# Patient Record
Sex: MALE | Race: WHITE | NOT HISPANIC OR LATINO | Employment: FULL TIME | URBAN - NONMETROPOLITAN AREA
[De-identification: names, ages, dates, MRNs, and addresses within clinical notes are randomized per-mention and may not be internally consistent; named-entity substitution may affect disease eponyms.]

---

## 2017-06-26 ENCOUNTER — TELEMEDICINE ORIGINATING SITE VISIT (OUTPATIENT)
Dept: MEDICAL GROUP | Facility: CLINIC | Age: 62
End: 2017-06-26
Payer: OTHER GOVERNMENT

## 2017-06-26 ENCOUNTER — TELEMEDICINE2 (OUTPATIENT)
Dept: MEDICAL GROUP | Age: 62
End: 2017-06-26
Payer: OTHER GOVERNMENT

## 2017-06-26 VITALS
RESPIRATION RATE: 16 BRPM | DIASTOLIC BLOOD PRESSURE: 77 MMHG | HEIGHT: 72 IN | OXYGEN SATURATION: 96 % | SYSTOLIC BLOOD PRESSURE: 133 MMHG | WEIGHT: 167 LBS | BODY MASS INDEX: 22.62 KG/M2 | HEART RATE: 80 BPM | TEMPERATURE: 99 F

## 2017-06-26 DIAGNOSIS — I10 ESSENTIAL HYPERTENSION: ICD-10-CM

## 2017-06-26 DIAGNOSIS — E55.9 VITAMIN D DEFICIENCY: ICD-10-CM

## 2017-06-26 DIAGNOSIS — E53.8 VITAMIN B 12 DEFICIENCY: ICD-10-CM

## 2017-06-26 DIAGNOSIS — K63.5 POLYP OF COLON, UNSPECIFIED PART OF COLON, UNSPECIFIED TYPE: ICD-10-CM

## 2017-06-26 DIAGNOSIS — Z12.5 SCREENING FOR PROSTATE CANCER: ICD-10-CM

## 2017-06-26 DIAGNOSIS — Z13.220 SCREENING CHOLESTEROL LEVEL: ICD-10-CM

## 2017-06-26 DIAGNOSIS — G62.9 NEUROPATHY: ICD-10-CM

## 2017-06-26 PROCEDURE — 99214 OFFICE O/P EST MOD 30 MIN: CPT | Mod: GT | Performed by: INTERNAL MEDICINE

## 2017-06-26 ASSESSMENT — PATIENT HEALTH QUESTIONNAIRE - PHQ9: CLINICAL INTERPRETATION OF PHQ2 SCORE: 0

## 2017-06-26 NOTE — PROGRESS NOTES
CC: Follow-up hypertension    Verified identification with patient. Secured video conference with RN presenter in [facility]      HPI:   Eugene presents today with the following.    1. Essential hypertension  Patient has been compliant with his lisinopril 20 mg daily. Blood pressure averaging 1:30/75. Patient denies chest pain, headache, dizziness, shortness of breath or edema.    2. Polyp of colon, unspecified part of colon, unspecified type  Per patient, colonoscopy performed in [place] in 2014. Possible precancerous lesions. Patient has been in contact with the referral department for GI consult. Patient denies melena, stool changes, weight loss.    3. Vitamin B 12 deficiency  Patient taking over-the-counter liquid vitamin B-12. No labs available    4. Neuropathy (CMS-HCC)  Secondary to vitamin B 12 deficiency        Patient Active Problem List    Diagnosis Date Noted   • Hypertension 06/20/2016   • Environmental allergies 06/20/2016   • Colon polyp 06/20/2016   • Neuropathy (CMS-HCC) 06/20/2016   • Vitamin B 12 deficiency 06/20/2016       Current Outpatient Prescriptions   Medication Sig Dispense Refill   • lisinopril (PRINIVIL) 20 MG Tab Take 1 Tab by mouth every day. 90 Tab 3   • lisinopril (PRINIVIL) 20 MG Tab Take 20 mg by mouth every day.     • fluticasone (FLONASE) 50 MCG/ACT nasal spray Spray 1 Spray in nose every day.       No current facility-administered medications for this visit.         Allergies as of 06/26/2017   • (No Known Allergies)        ROS: As per HPI. Patient denies all other cardiopulmonary, GI, neurologic, musculoskeletal symptoms    /77 mmHg  Pulse 80  Temp(Src) 37.2 °C (99 °F)  Resp 16  Ht 1.829 m (6')  Wt 75.751 kg (167 lb)  BMI 22.64 kg/m2  SpO2 96%    Physical Exam:  Gen:         Alert and oriented, No apparent distress.  Neck:        No Lymphadenopathy or Bruits.  Lungs:     Clear to auscultation bilaterally  CV:          Regular rate and rhythm. No murmurs, rubs or  gallops.  Abd:         Soft non tender, non distended. Normal active bowel sounds.  No  Hepatosplenomegaly, No pulsatile masses.                   Ext:          No clubbing, cyanosis, edema.      Assessment and Plan.   62 y.o. male with the following issues.    1. Essential hypertension  Continue lisinopril 20 mg daily  -DASH diet    - COMP METABOLIC PANEL; Future  - CBC WITH DIFFERENTIAL; Future    2. Polyp of colon, unspecified part of colon, unspecified type  Follow-up colonoscopy with GI  Obtain medical records from prior colonoscopy.    - CBC WITH DIFFERENTIAL; Future    3. Vitamin B 12 deficiency  Check vitamin B-12 levels    - CBC WITH DIFFERENTIAL; Future    4. Neuropathy (CMS-HCC)    - VITAMIN B12; Future    5. Screening cholesterol level  No recent lipid panel    - LIPID PROFILE; Future    6. Vitamin D deficiency    - VITAMIN D,25 HYDROXY; Future    7. Screening for prostate cancer    - PROSTATE SPECIFIC AG DIAGNOSTIC; Future            Please note that this dictation was created using voice recognition software. I have made every reasonable attempt to correct obvious errors, but expect that there are errors of grammar and possible content that I did not discover before finalizing note.

## 2017-06-26 NOTE — MR AVS SNAPSHOT
Eugene Klein   2017 12:00 PM   Telemedicine2   MRN: [id number]    Department:  [address] [place] [address]   Dept Phone:  [phone]    Description:  Male : 1955   Provider:  Magdalena Salazar M.D.; TELEMED [place]           Reason for Visit     Medication Refill           Allergies as of 2017     No Known Allergies      You were diagnosed with     Essential hypertension   [0163458]       Polyp of colon, unspecified part of colon, unspecified type   [3923514]       Vitamin B 12 deficiency   [693354]       Neuropathy (CMS-HCC)   [854336]       Screening cholesterol level   [015324]       Vitamin D deficiency   [2998638]         Vital Signs     Blood Pressure Pulse Temperature Respirations Height Weight    133/77 mmHg 80 37.2 °C (99 °F) 16 1.829 m (6') 75.751 kg (167 lb)    Body Mass Index Oxygen Saturation Smoking Status             22.64 kg/m2 96% Former Smoker         Basic Information     Date Of Birth Sex Race Ethnicity Preferred Language    1955 Male White Non- English      Problem List              ICD-10-CM Priority Class Noted - Resolved    Hypertension I10   2016 - Present    Environmental allergies Z91.09   2016 - Present    Colon polyp K63.5   2016 - Present    Neuropathy (CMS-HCC) G62.9   2016 - Present    Vitamin B 12 deficiency E53.8   2016 - Present      Health Maintenance        Date Due Completion Dates    IMM DTaP/Tdap/Td Vaccine (1 - Tdap) 1974 ---    COLONOSCOPY 2005 ---    IMM ZOSTER VACCINE 2015 ---            Current Immunizations     No immunizations on file.      Below and/or attached are the medications your provider expects you to take. Review all of your home medications and newly ordered medications with your provider and/or pharmacist. Follow medication instructions as directed by your provider and/or pharmacist. Please keep your medication list with you and share with your provider. Update the information when  medications are discontinued, doses are changed, or new medications (including over-the-counter products) are added; and carry medication information at all times in the event of emergency situations     Allergies:  No Known Allergies          Medications  Valid as of: June 26, 2017 - 12:07 PM    Generic Name Brand Name Tablet Size Instructions for use    Fluticasone Propionate (Suspension) FLONASE 50 MCG/ACT Spray 1 Spray in nose every day.        Lisinopril (Tab) PRINIVIL 20 MG Take 20 mg by mouth every day.        Lisinopril (Tab) PRINIVIL 20 MG Take 1 Tab by mouth every day.        .                 Medicines prescribed today were sent to:     [email or web address] - [place], [place] - [address]    [address] [facility] [address] [address]    Phone: [phone] Fax: [phone]    Open 24 Hours?: No    [facility] - [place], [place] - [address]    [address] [place] [address]    Phone: [phone] Fax: [phone]    Open 24 Hours?: No      Medication refill instructions:       If your prescription bottle indicates you have medication refills left, it is not necessary to call your provider’s office. Please contact your pharmacy and they will refill your medication.    If your prescription bottle indicates you do not have any refills left, you may request refills at any time through one of the following ways: The online [employer] system (except Urgent Care), by calling your provider’s office, or by asking your pharmacy to contact your provider’s office with a refill request. Medication refills are processed only during regular business hours and may not be available until the next business day. Your provider may request additional information or to have a follow-up visit with you prior to refilling your medication.   *Please Note: Medication refills are assigned a new Rx number when refilled electronically. Your pharmacy may indicate that no refills were authorized even though a  new prescription for the same medication is available at the pharmacy. Please request the medicine by name with the pharmacy before contacting your provider for a refill.           MyChart Access Code: Activation code not generated  Current [facility] Status: Active

## 2017-06-27 ENCOUNTER — NON-PROVIDER VISIT (OUTPATIENT)
Dept: MEDICAL GROUP | Facility: CLINIC | Age: 62
End: 2017-06-27
Payer: OTHER GOVERNMENT

## 2017-06-27 DIAGNOSIS — I10 ESSENTIAL HYPERTENSION: ICD-10-CM

## 2017-06-27 PROCEDURE — 36415 COLL VENOUS BLD VENIPUNCTURE: CPT | Performed by: FAMILY MEDICINE

## 2017-06-27 NOTE — PROGRESS NOTES
Eugene Klein is a 62 y.o. male here for a non-provider visit for Venipuncture     If abnormal was an in office provider notified today (if so, indicate provider)? Yes  Routed to PCP? Yes    Venipuncture drawn, packaged and sent with [facility]

## 2017-06-27 NOTE — MR AVS SNAPSHOT
Eugene Trujillo Link   2017 8:00 AM   Non-Provider Visit   MRN: [id number]    Department:  [facility]   Dept Phone:  [phone]    Description:  Male : 1955   Provider:  HANY SIBLEY           Reason for Visit     Other Venipuncture      Allergies as of 2017     No Known Allergies      You were diagnosed with     Essential hypertension   [9190664]         Vital Signs     Smoking Status                   Former Smoker           Basic Information     Date Of Birth Sex Race Ethnicity Preferred Language    1955 Male White Non- English      Your appointments     2017  8:00 AM   Non Provider 1 with HANY SIBLEY   [facility] (--)    [address] [address] [phone]           You will be receiving a confirmation call a few days before your appointment from our automated call confirmation system.              Problem List              ICD-10-CM Priority Class Noted - Resolved    Hypertension I10   2016 - Present    Environmental allergies Z91.09   2016 - Present    Colon polyp K63.5   2016 - Present    Neuropathy (CMS-HCC) G62.9   2016 - Present    Vitamin B 12 deficiency E53.8   2016 - Present      Health Maintenance        Date Due Completion Dates    IMM DTaP/Tdap/Td Vaccine (1 - Tdap) 1974 ---    COLONOSCOPY 2005 ---    IMM ZOSTER VACCINE 2015 ---            Current Immunizations     No immunizations on file.      Below and/or attached are the medications your provider expects you to take. Review all of your home medications and newly ordered medications with your provider and/or pharmacist. Follow medication instructions as directed by your provider and/or pharmacist. Please keep your medication list with you and share with your provider. Update the information when medications are discontinued, doses are changed, or new medications (including over-the-counter products) are added; and carry  medication information at all times in the event of emergency situations     Allergies:  No Known Allergies          Medications  Valid as of: June 27, 2017 -  7:38 AM    Generic Name Brand Name Tablet Size Instructions for use    Fluticasone Propionate (Suspension) FLONASE 50 MCG/ACT Spray 1 Spray in nose every day.        Lisinopril (Tab) PRINIVIL 20 MG Take 20 mg by mouth every day.        Lisinopril (Tab) PRINIVIL 20 MG Take 1 Tab by mouth every day.        .                 Medicines prescribed today were sent to:     [facility] - [address]    [address] [facility] [address]    Phone: [phone] Fax: [phone]    Open 24 Hours?: No    [facility] - [place], [place] - [address]    [address] [place] [address]    Phone: [phone] Fax: [phone]    Open 24 Hours?: No      Medication refill instructions:       If your prescription bottle indicates you have medication refills left, it is not necessary to call your provider’s office. Please contact your pharmacy and they will refill your medication.    If your prescription bottle indicates you do not have any refills left, you may request refills at any time through one of the following ways: The online [employer] system (except Urgent Care), by calling your provider’s office, or by asking your pharmacy to contact your provider’s office with a refill request. Medication refills are processed only during regular business hours and may not be available until the next business day. Your provider may request additional information or to have a follow-up visit with you prior to refilling your medication.   *Please Note: Medication refills are assigned a new Rx number when refilled electronically. Your pharmacy may indicate that no refills were authorized even though a new prescription for the same medication is available at the pharmacy. Please request the medicine by name with the pharmacy  before contacting your provider for a refill.           MyChart Access Code: Activation code not generated  Current MyChart Status: Active

## 2017-09-06 DIAGNOSIS — I10 ESSENTIAL HYPERTENSION: ICD-10-CM

## 2017-09-06 RX ORDER — LISINOPRIL 20 MG/1
20 TABLET ORAL DAILY
Qty: 90 TAB | Refills: 3 | Status: SHIPPED | OUTPATIENT
Start: 2017-09-06 | End: 2018-08-01 | Stop reason: SDUPTHER

## 2018-03-08 ENCOUNTER — TELEMEDICINE2 (OUTPATIENT)
Dept: MEDICAL GROUP | Age: 63
End: 2018-03-08
Payer: OTHER GOVERNMENT

## 2018-03-08 ENCOUNTER — TELEMEDICINE ORIGINATING SITE VISIT (OUTPATIENT)
Dept: MEDICAL GROUP | Facility: CLINIC | Age: 63
End: 2018-03-08
Payer: OTHER GOVERNMENT

## 2018-03-08 ENCOUNTER — NON-PROVIDER VISIT (OUTPATIENT)
Dept: MEDICAL GROUP | Facility: CLINIC | Age: 63
End: 2018-03-08
Payer: OTHER GOVERNMENT

## 2018-03-08 VITALS
DIASTOLIC BLOOD PRESSURE: 78 MMHG | SYSTOLIC BLOOD PRESSURE: 146 MMHG | HEART RATE: 67 BPM | WEIGHT: 163 LBS | OXYGEN SATURATION: 95 % | TEMPERATURE: 98.1 F | BODY MASS INDEX: 22.08 KG/M2 | HEIGHT: 72 IN | RESPIRATION RATE: 16 BRPM

## 2018-03-08 DIAGNOSIS — Z12.5 SCREENING FOR PROSTATE CANCER: ICD-10-CM

## 2018-03-08 DIAGNOSIS — K63.5 POLYP OF COLON, UNSPECIFIED PART OF COLON, UNSPECIFIED TYPE: ICD-10-CM

## 2018-03-08 DIAGNOSIS — E55.9 VITAMIN D DEFICIENCY: ICD-10-CM

## 2018-03-08 DIAGNOSIS — G62.9 NEUROPATHY: ICD-10-CM

## 2018-03-08 DIAGNOSIS — I10 ESSENTIAL HYPERTENSION: ICD-10-CM

## 2018-03-08 DIAGNOSIS — R05.9 COUGH: ICD-10-CM

## 2018-03-08 DIAGNOSIS — Z13.220 SCREENING CHOLESTEROL LEVEL: ICD-10-CM

## 2018-03-08 DIAGNOSIS — Z91.09 ENVIRONMENTAL ALLERGIES: ICD-10-CM

## 2018-03-08 PROCEDURE — 99214 OFFICE O/P EST MOD 30 MIN: CPT | Performed by: INTERNAL MEDICINE

## 2018-03-08 PROCEDURE — 36415 COLL VENOUS BLD VENIPUNCTURE: CPT | Performed by: INTERNAL MEDICINE

## 2018-03-08 RX ORDER — FLUTICASONE PROPIONATE 50 MCG
1 SPRAY, SUSPENSION (ML) NASAL DAILY
Qty: 3 BOTTLE | Refills: 2 | Status: SHIPPED | OUTPATIENT
Start: 2018-03-08 | End: 2019-11-19 | Stop reason: SDUPTHER

## 2018-03-08 RX ORDER — POLYETHYLENE GLYCOL-3350 AND ELECTROLYTES 236; 6.74; 5.86; 2.97; 22.74 G/274.31G; G/274.31G; G/274.31G; G/274.31G; G/274.31G
POWDER, FOR SOLUTION ORAL
COMMUNITY
Start: 2018-03-07

## 2018-03-08 ASSESSMENT — PATIENT HEALTH QUESTIONNAIRE - PHQ9: CLINICAL INTERPRETATION OF PHQ2 SCORE: 0

## 2018-03-08 NOTE — PROGRESS NOTES
Eugene Klein is a 63 y.o. male here for a non-provider visit for Venipuncture    If abnormal was an in office provider notified upon receipt of results (if so, indicate provider)? Yes  Routed to PCP? Yes

## 2018-03-09 LAB
25(OH)D3+25(OH)D2 SERPL-MCNC: 28.4 NG/ML (ref 30–100)
ALBUMIN SERPL-MCNC: 4.2 G/DL (ref 3.6–4.8)
ALBUMIN/GLOB SERPL: 1.8 {RATIO} (ref 1.2–2.2)
ALP SERPL-CCNC: 51 IU/L (ref 39–117)
ALT SERPL-CCNC: 10 IU/L (ref 0–44)
AST SERPL-CCNC: 18 IU/L (ref 0–40)
BASOPHILS # BLD AUTO: 0.1 X10E3/UL (ref 0–0.2)
BASOPHILS NFR BLD AUTO: 2 %
BILIRUB SERPL-MCNC: 0.6 MG/DL (ref 0–1.2)
BUN SERPL-MCNC: 10 MG/DL (ref 8–27)
BUN/CREAT SERPL: 13 (ref 10–24)
CALCIUM SERPL-MCNC: 9.4 MG/DL (ref 8.6–10.2)
CHLORIDE SERPL-SCNC: 95 MMOL/L (ref 96–106)
CHOLEST SERPL-MCNC: 179 MG/DL (ref 100–199)
CHOLEST/HDLC SERPL: 1.8 RATIO UNITS (ref 0–5)
CO2 SERPL-SCNC: 24 MMOL/L (ref 18–29)
CREAT SERPL-MCNC: 0.8 MG/DL (ref 0.76–1.27)
EOSINOPHIL # BLD AUTO: 0.2 X10E3/UL (ref 0–0.4)
EOSINOPHIL NFR BLD AUTO: 3 %
ERYTHROCYTE [DISTWIDTH] IN BLOOD BY AUTOMATED COUNT: 12.8 % (ref 12.3–15.4)
GFR SERPLBLD CREATININE-BSD FMLA CKD-EPI: 110 ML/MIN/1.73
GFR SERPLBLD CREATININE-BSD FMLA CKD-EPI: 95 ML/MIN/1.73
GLOBULIN SER CALC-MCNC: 2.4 G/DL (ref 1.5–4.5)
GLUCOSE SERPL-MCNC: 107 MG/DL (ref 65–99)
HCT VFR BLD AUTO: 45.8 % (ref 37.5–51)
HDLC SERPL-MCNC: 100 MG/DL
HGB BLD-MCNC: 15.4 G/DL (ref 13–17.7)
IMM GRANULOCYTES # BLD: 0 X10E3/UL (ref 0–0.1)
IMM GRANULOCYTES NFR BLD: 0 %
IMMATURE CELLS  115398: ABNORMAL
LABORATORY COMMENT REPORT: NORMAL
LDLC SERPL CALC-MCNC: 71 MG/DL (ref 0–99)
LYMPHOCYTES # BLD AUTO: 2.6 X10E3/UL (ref 0.7–3.1)
LYMPHOCYTES NFR BLD AUTO: 37 %
MCH RBC QN AUTO: 34.5 PG (ref 26.6–33)
MCHC RBC AUTO-ENTMCNC: 33.6 G/DL (ref 31.5–35.7)
MCV RBC AUTO: 103 FL (ref 79–97)
MONOCYTES # BLD AUTO: 0.9 X10E3/UL (ref 0.1–0.9)
MONOCYTES NFR BLD AUTO: 13 %
MORPHOLOGY BLD-IMP: ABNORMAL
NEUTROPHILS # BLD AUTO: 3.2 X10E3/UL (ref 1.4–7)
NEUTROPHILS NFR BLD AUTO: 45 %
NRBC BLD AUTO-RTO: ABNORMAL %
PLATELET # BLD AUTO: 291 X10E3/UL (ref 150–379)
POTASSIUM SERPL-SCNC: 5.4 MMOL/L (ref 3.5–5.2)
PROT SERPL-MCNC: 6.6 G/DL (ref 6–8.5)
PSA SERPL-MCNC: 0.6 NG/ML (ref 0–4)
RBC # BLD AUTO: 4.46 X10E6/UL (ref 4.14–5.8)
SODIUM SERPL-SCNC: 134 MMOL/L (ref 134–144)
TRIGL SERPL-MCNC: 41 MG/DL (ref 0–149)
TSH SERPL DL<=0.005 MIU/L-ACNC: 0.7 UIU/ML (ref 0.45–4.5)
VLDLC SERPL CALC-MCNC: 8 MG/DL (ref 5–40)
WBC # BLD AUTO: 7.1 X10E3/UL (ref 3.4–10.8)

## 2018-03-13 ENCOUNTER — APPOINTMENT (OUTPATIENT)
Dept: RADIOLOGY | Facility: IMAGING CENTER | Age: 63
End: 2018-03-13
Payer: OTHER GOVERNMENT

## 2018-03-13 DIAGNOSIS — R05.9 COUGH: ICD-10-CM

## 2018-03-13 PROCEDURE — 71046 X-RAY EXAM CHEST 2 VIEWS: CPT | Mod: TC | Performed by: INTERNAL MEDICINE

## 2018-08-01 DIAGNOSIS — I10 ESSENTIAL HYPERTENSION: ICD-10-CM

## 2018-08-02 RX ORDER — LISINOPRIL 20 MG/1
TABLET ORAL
Qty: 90 TAB | Refills: 3 | Status: SHIPPED | OUTPATIENT
Start: 2018-08-02 | End: 2019-07-31 | Stop reason: SDUPTHER

## 2018-08-02 RX ORDER — OMEPRAZOLE 20 MG/1
CAPSULE, DELAYED RELEASE ORAL
COMMUNITY
Start: 2018-05-29

## 2018-08-02 RX ORDER — CIPROFLOXACIN HYDROCHLORIDE 3.5 MG/ML
SOLUTION/ DROPS TOPICAL
Refills: 0 | COMMUNITY
Start: 2018-07-06

## 2019-10-21 DIAGNOSIS — I10 ESSENTIAL HYPERTENSION: ICD-10-CM

## 2019-10-21 RX ORDER — LISINOPRIL 20 MG/1
TABLET ORAL
Qty: 30 TAB | Refills: 1 | Status: SHIPPED | OUTPATIENT
Start: 2019-10-21

## 2019-11-19 DIAGNOSIS — Z91.09 ENVIRONMENTAL ALLERGIES: ICD-10-CM

## 2019-11-19 RX ORDER — FLUTICASONE PROPIONATE 50 MCG
1 SPRAY, SUSPENSION (ML) NASAL DAILY
Qty: 3 BOTTLE | Refills: 2 | Status: SHIPPED | OUTPATIENT
Start: 2019-11-19

## 2019-11-19 NOTE — TELEPHONE ENCOUNTER
Was the patient seen in the last year in this department? Yes    Does patient have an active prescription for medications requested? No     Received Request Via: Pharmacy     Requested Prescriptions     Pending Prescriptions Disp Refills   • fluticasone (FLONASE) 50 MCG/ACT nasal spray 3 Bottle 2     Sig: Spray 1 Spray in nose every day.

## 2019-12-24 DIAGNOSIS — I10 ESSENTIAL HYPERTENSION: ICD-10-CM

## 2019-12-24 RX ORDER — LISINOPRIL 20 MG/1
TABLET ORAL
Qty: 30 TAB | Refills: 1 | OUTPATIENT
Start: 2019-12-24

## 2019-12-24 NOTE — TELEPHONE ENCOUNTER
Was the patient seen in the last year in this department? Yes    Does patient have an active prescription for medications requested? Yes    Received Request Via: Pharmacy     Requested Prescriptions     Pending Prescriptions Disp Refills   • lisinopril (PRINIVIL) 20 MG Tab 30 Tab 1

## 2024-03-21 PROBLEM — M19.021 ARTHRITIS OF RIGHT ELBOW: Status: ACTIVE | Noted: 2024-03-21

## 2024-03-21 NOTE — H&P (VIEW-ONLY)
Subjective   Patient ID:  Eugene Klein is a 69 y.o. male.    Chief Complaint:  Pain of the Right Elbow      Last Surgery: No surgery found on No surgery found    HPI       Mr. Klein is a 69 year old right hand dominant retired male who comes in today for evaluation of right elbow pain for the past few years, worse for the past 6 months. He reports severe pain on a daily basis when pushing, pulling, and at night. He states that that the pain was initially onset after hammering a fence years ago. He saw Dr. Carmen who tried corticosteroid injection in the elbow which afforded him minimal relief. He then had a debridement of osteophytes and and loose bodies of the right elbow 1 year ago with Dr. Carmen which afforded him minimal relief. His severe and constant aching pain ranges from 3 at rest to 10 out of 10 with activities. Denies any numbness or tingling. He has tried OTC pain medication without any significant relief. He has tried physical therapy which made his pain worse. He recreationally smokes marijuana and drinks alcohol.     Physical Exam  General:  Well appearing, in no acute distress. Appropriate and cooperative with the exam.  HEENT: Normocephalic, atraumatic. Cranial nerves II-XII are grossly intact.  Cardiovascular: 2+ distal pulses. No cyanosis, clubbing, or edema.  Pulmonary: Breathing comfortably on room air without labor.  Abdomen: Soft and unremarkable.  Skin: No rashes, jaundice, or cyanosis.  Lymphatic: No epitrochlear lymphadenopathy.  Spine:  Clinically midline.   Gait:  Patient ambulates without a limp.   Musculoskeletal: Right elbow examination: Range of motion:  degrees with crepitation and pain testing motion. Full composite . He has palpable effusion over the posterior elbow.  Left thumb examination: IP joint has about 60 degrees of radial deviation, good flexion/extension of the IP joint.    Neurological: Sensation intact to light touch median, radial, ulnar nerves.       Imaging  Dx-Elbow-Complete 3+  3 views right elbow (AP oblique lateral) demonstrate severe   tricompartmental osteoarthritis with minimal to no remaining joint space,   erosive.    Encounter Diagnoses:   A 69 year old right hand dominant retired male with right elbow severe erosive  tricompartmental osteoarthritis.    Plan  I had a thorough discussion with the patient regarding the diagnosis.  We discussed conservative versus operative treatment.  After discussing the advantages and disadvantages of each, the patient elects for surgical management with a right total elbow arthroplasty. We discussed that the risk added from smoking marijuana and drinking alcohol would be from a fall onto the elbow when intoxicated. I recommended moderation of these. I will see him back post-operatively. Narcotic  reviewed. The patient understands the risks, benefits, and alternatives to this plan and wishes to proceed. All questions were answered.     Orders Placed This Encounter    Surgical Case Request: Right total elbow arthroplasty    DX-ELBOW-COMPLETE 3+ RIGHT     Return for Post-Op, Imaging.    IEphraim, am scribing for and in the presence of  To Sierra MD.    ITo MD, personally performed the services described in this documentation, as scribed by Ephraim Franklin in my presence. I do hereby attest that this information is true, accurate, and complete to the best of my knowledge.

## 2024-04-05 ENCOUNTER — APPOINTMENT (OUTPATIENT)
Dept: ADMISSIONS | Facility: MEDICAL CENTER | Age: 69
End: 2024-04-05
Attending: SURGERY
Payer: MEDICARE

## 2024-04-10 ENCOUNTER — PRE-ADMISSION TESTING (OUTPATIENT)
Dept: ADMISSIONS | Facility: MEDICAL CENTER | Age: 69
End: 2024-04-10
Attending: SURGERY
Payer: MEDICARE

## 2024-04-10 RX ORDER — LOSARTAN POTASSIUM 100 MG/1
100 TABLET ORAL
COMMUNITY
Start: 2024-01-19

## 2024-04-17 ENCOUNTER — HOSPITAL ENCOUNTER (OUTPATIENT)
Facility: MEDICAL CENTER | Age: 69
End: 2024-04-17
Attending: SURGERY | Admitting: SURGERY
Payer: MEDICARE

## 2024-04-17 ENCOUNTER — ANESTHESIA (OUTPATIENT)
Dept: SURGERY | Facility: MEDICAL CENTER | Age: 69
End: 2024-04-17
Payer: MEDICARE

## 2024-04-17 ENCOUNTER — PHARMACY VISIT (OUTPATIENT)
Dept: PHARMACY | Facility: MEDICAL CENTER | Age: 69
End: 2024-04-17
Payer: COMMERCIAL

## 2024-04-17 ENCOUNTER — APPOINTMENT (OUTPATIENT)
Dept: RADIOLOGY | Facility: MEDICAL CENTER | Age: 69
End: 2024-04-17
Attending: SURGERY
Payer: MEDICARE

## 2024-04-17 ENCOUNTER — ANESTHESIA EVENT (OUTPATIENT)
Dept: SURGERY | Facility: MEDICAL CENTER | Age: 69
End: 2024-04-17
Payer: MEDICARE

## 2024-04-17 VITALS
SYSTOLIC BLOOD PRESSURE: 160 MMHG | HEART RATE: 71 BPM | RESPIRATION RATE: 16 BRPM | HEIGHT: 72 IN | BODY MASS INDEX: 20.25 KG/M2 | DIASTOLIC BLOOD PRESSURE: 77 MMHG | OXYGEN SATURATION: 96 % | WEIGHT: 149.47 LBS | TEMPERATURE: 97.1 F

## 2024-04-17 DIAGNOSIS — M19.021 ARTHRITIS OF RIGHT ELBOW: ICD-10-CM

## 2024-04-17 PROBLEM — R11.2 PONV (POSTOPERATIVE NAUSEA AND VOMITING): Status: ACTIVE | Noted: 2024-04-17

## 2024-04-17 PROBLEM — Z98.890 PONV (POSTOPERATIVE NAUSEA AND VOMITING): Status: ACTIVE | Noted: 2024-04-17

## 2024-04-17 PROBLEM — Z78.9 ALCOHOL USE: Status: ACTIVE | Noted: 2024-04-17

## 2024-04-17 LAB — EKG IMPRESSION: NORMAL

## 2024-04-17 PROCEDURE — 160048 HCHG OR STATISTICAL LEVEL 1-5: Performed by: SURGERY

## 2024-04-17 PROCEDURE — C1713 ANCHOR/SCREW BN/BN,TIS/BN: HCPCS | Performed by: SURGERY

## 2024-04-17 PROCEDURE — C1776 JOINT DEVICE (IMPLANTABLE): HCPCS | Performed by: SURGERY

## 2024-04-17 PROCEDURE — 160025 RECOVERY II MINUTES (STATS): Performed by: SURGERY

## 2024-04-17 PROCEDURE — RXOTC WILLOW AMBULATORY OTC CHARGE

## 2024-04-17 PROCEDURE — 93005 ELECTROCARDIOGRAM TRACING: CPT | Performed by: SURGERY

## 2024-04-17 PROCEDURE — 700101 HCHG RX REV CODE 250: Performed by: ANESTHESIOLOGY

## 2024-04-17 PROCEDURE — A9270 NON-COVERED ITEM OR SERVICE: HCPCS | Performed by: ANESTHESIOLOGY

## 2024-04-17 PROCEDURE — 160041 HCHG SURGERY MINUTES - EA ADDL 1 MIN LEVEL 4: Performed by: SURGERY

## 2024-04-17 PROCEDURE — 93010 ELECTROCARDIOGRAM REPORT: CPT | Performed by: INTERNAL MEDICINE

## 2024-04-17 PROCEDURE — 700101 HCHG RX REV CODE 250: Performed by: SURGERY

## 2024-04-17 PROCEDURE — RXMED WILLOW AMBULATORY MEDICATION CHARGE: Performed by: SURGERY

## 2024-04-17 PROCEDURE — 64415 NJX AA&/STRD BRCH PLXS IMG: CPT | Performed by: SURGERY

## 2024-04-17 PROCEDURE — 24130 EXCISION RADIAL HEAD: CPT | Mod: RT | Performed by: SURGERY

## 2024-04-17 PROCEDURE — 700111 HCHG RX REV CODE 636 W/ 250 OVERRIDE (IP): Performed by: ANESTHESIOLOGY

## 2024-04-17 PROCEDURE — 160029 HCHG SURGERY MINUTES - 1ST 30 MINS LEVEL 4: Performed by: SURGERY

## 2024-04-17 PROCEDURE — 24363 REPLACE ELBOW JOINT: CPT | Mod: RT | Performed by: SURGERY

## 2024-04-17 PROCEDURE — 160046 HCHG PACU - 1ST 60 MINS PHASE II: Performed by: SURGERY

## 2024-04-17 PROCEDURE — 502000 HCHG MISC OR IMPLANTS RC 0278: Performed by: SURGERY

## 2024-04-17 PROCEDURE — 700105 HCHG RX REV CODE 258: Performed by: ANESTHESIOLOGY

## 2024-04-17 PROCEDURE — 73070 X-RAY EXAM OF ELBOW: CPT | Mod: RT

## 2024-04-17 PROCEDURE — 160035 HCHG PACU - 1ST 60 MINS PHASE I: Performed by: SURGERY

## 2024-04-17 PROCEDURE — 700102 HCHG RX REV CODE 250 W/ 637 OVERRIDE(OP): Performed by: ANESTHESIOLOGY

## 2024-04-17 PROCEDURE — 160002 HCHG RECOVERY MINUTES (STAT): Performed by: SURGERY

## 2024-04-17 PROCEDURE — 160009 HCHG ANES TIME/MIN: Performed by: SURGERY

## 2024-04-17 DEVICE — BONE CEMENT SIMPLEX ANTIBIO - (10/PK): Type: IMPLANTABLE DEVICE | Site: ELBOW | Status: FUNCTIONAL

## 2024-04-17 DEVICE — IMPLANTABLE DEVICE: Type: IMPLANTABLE DEVICE | Site: ELBOW | Status: FUNCTIONAL

## 2024-04-17 RX ORDER — DIPHENHYDRAMINE HYDROCHLORIDE 50 MG/ML
12.5 INJECTION INTRAMUSCULAR; INTRAVENOUS
Status: DISCONTINUED | OUTPATIENT
Start: 2024-04-17 | End: 2024-04-17 | Stop reason: HOSPADM

## 2024-04-17 RX ORDER — TRANEXAMIC ACID 100 MG/ML
INJECTION, SOLUTION INTRAVENOUS
Status: COMPLETED
Start: 2024-04-17 | End: 2024-04-17

## 2024-04-17 RX ORDER — HALOPERIDOL 5 MG/ML
1 INJECTION INTRAMUSCULAR
Status: DISCONTINUED | OUTPATIENT
Start: 2024-04-17 | End: 2024-04-17 | Stop reason: HOSPADM

## 2024-04-17 RX ORDER — DEXAMETHASONE SODIUM PHOSPHATE 4 MG/ML
INJECTION, SOLUTION INTRA-ARTICULAR; INTRALESIONAL; INTRAMUSCULAR; INTRAVENOUS; SOFT TISSUE
Status: COMPLETED | OUTPATIENT
Start: 2024-04-17 | End: 2024-04-17

## 2024-04-17 RX ORDER — OXYCODONE HYDROCHLORIDE 5 MG/1
5 TABLET ORAL EVERY 4 HOURS PRN
Qty: 30 TABLET | Refills: 0 | Status: SHIPPED | OUTPATIENT
Start: 2024-04-17 | End: 2024-04-24

## 2024-04-17 RX ORDER — OXYCODONE HCL 5 MG/5 ML
5 SOLUTION, ORAL ORAL
Status: DISCONTINUED | OUTPATIENT
Start: 2024-04-17 | End: 2024-04-17 | Stop reason: HOSPADM

## 2024-04-17 RX ORDER — HYDROMORPHONE HYDROCHLORIDE 1 MG/ML
0.1 INJECTION, SOLUTION INTRAMUSCULAR; INTRAVENOUS; SUBCUTANEOUS
Status: DISCONTINUED | OUTPATIENT
Start: 2024-04-17 | End: 2024-04-17 | Stop reason: HOSPADM

## 2024-04-17 RX ORDER — SODIUM CHLORIDE, SODIUM LACTATE, POTASSIUM CHLORIDE, CALCIUM CHLORIDE 600; 310; 30; 20 MG/100ML; MG/100ML; MG/100ML; MG/100ML
INJECTION, SOLUTION INTRAVENOUS CONTINUOUS
Status: DISCONTINUED | OUTPATIENT
Start: 2024-04-17 | End: 2024-04-17 | Stop reason: HOSPADM

## 2024-04-17 RX ORDER — GABAPENTIN 300 MG/1
300 CAPSULE ORAL ONCE
Status: COMPLETED | OUTPATIENT
Start: 2024-04-17 | End: 2024-04-17

## 2024-04-17 RX ORDER — CEFAZOLIN SODIUM 1 G/3ML
INJECTION, POWDER, FOR SOLUTION INTRAMUSCULAR; INTRAVENOUS PRN
Status: DISCONTINUED | OUTPATIENT
Start: 2024-04-17 | End: 2024-04-17 | Stop reason: SURG

## 2024-04-17 RX ORDER — CEFAZOLIN SODIUM 1 G/3ML
2 INJECTION, POWDER, FOR SOLUTION INTRAMUSCULAR; INTRAVENOUS ONCE
Status: DISCONTINUED | OUTPATIENT
Start: 2024-04-17 | End: 2024-04-17 | Stop reason: HOSPADM

## 2024-04-17 RX ORDER — DEXAMETHASONE SODIUM PHOSPHATE 4 MG/ML
INJECTION, SOLUTION INTRA-ARTICULAR; INTRALESIONAL; INTRAMUSCULAR; INTRAVENOUS; SOFT TISSUE PRN
Status: DISCONTINUED | OUTPATIENT
Start: 2024-04-17 | End: 2024-04-17 | Stop reason: SURG

## 2024-04-17 RX ORDER — OXYCODONE HCL 5 MG/5 ML
10 SOLUTION, ORAL ORAL
Status: DISCONTINUED | OUTPATIENT
Start: 2024-04-17 | End: 2024-04-17 | Stop reason: HOSPADM

## 2024-04-17 RX ORDER — EPHEDRINE SULFATE 50 MG/ML
5 INJECTION, SOLUTION INTRAVENOUS
Status: DISCONTINUED | OUTPATIENT
Start: 2024-04-17 | End: 2024-04-17 | Stop reason: HOSPADM

## 2024-04-17 RX ORDER — BUPIVACAINE HYDROCHLORIDE 5 MG/ML
INJECTION, SOLUTION EPIDURAL; INTRACAUDAL
Status: COMPLETED | OUTPATIENT
Start: 2024-04-17 | End: 2024-04-17

## 2024-04-17 RX ORDER — MAGNESIUM SULFATE HEPTAHYDRATE 40 MG/ML
INJECTION, SOLUTION INTRAVENOUS PRN
Status: DISCONTINUED | OUTPATIENT
Start: 2024-04-17 | End: 2024-04-17 | Stop reason: SURG

## 2024-04-17 RX ORDER — ACETAMINOPHEN 500 MG
1000 TABLET ORAL ONCE
Status: COMPLETED | OUTPATIENT
Start: 2024-04-17 | End: 2024-04-17

## 2024-04-17 RX ORDER — LABETALOL HYDROCHLORIDE 5 MG/ML
5 INJECTION, SOLUTION INTRAVENOUS
Status: DISCONTINUED | OUTPATIENT
Start: 2024-04-17 | End: 2024-04-17 | Stop reason: HOSPADM

## 2024-04-17 RX ORDER — TRANEXAMIC ACID 100 MG/ML
INJECTION, SOLUTION INTRAVENOUS PRN
Status: DISCONTINUED | OUTPATIENT
Start: 2024-04-17 | End: 2024-04-17 | Stop reason: SURG

## 2024-04-17 RX ORDER — MIDAZOLAM HYDROCHLORIDE 1 MG/ML
INJECTION INTRAMUSCULAR; INTRAVENOUS PRN
Status: DISCONTINUED | OUTPATIENT
Start: 2024-04-17 | End: 2024-04-17 | Stop reason: SURG

## 2024-04-17 RX ORDER — HYDRALAZINE HYDROCHLORIDE 20 MG/ML
5 INJECTION INTRAMUSCULAR; INTRAVENOUS
Status: DISCONTINUED | OUTPATIENT
Start: 2024-04-17 | End: 2024-04-17 | Stop reason: HOSPADM

## 2024-04-17 RX ORDER — ONDANSETRON 2 MG/ML
INJECTION INTRAMUSCULAR; INTRAVENOUS PRN
Status: DISCONTINUED | OUTPATIENT
Start: 2024-04-17 | End: 2024-04-17 | Stop reason: SURG

## 2024-04-17 RX ORDER — HYDROMORPHONE HYDROCHLORIDE 1 MG/ML
0.2 INJECTION, SOLUTION INTRAMUSCULAR; INTRAVENOUS; SUBCUTANEOUS
Status: DISCONTINUED | OUTPATIENT
Start: 2024-04-17 | End: 2024-04-17 | Stop reason: HOSPADM

## 2024-04-17 RX ORDER — CLINDAMYCIN PHOSPHATE 900 MG/50ML
INJECTION, SOLUTION INTRAVENOUS
Status: DISCONTINUED
Start: 2024-04-17 | End: 2024-04-17 | Stop reason: HOSPADM

## 2024-04-17 RX ORDER — MIDAZOLAM HYDROCHLORIDE 1 MG/ML
INJECTION INTRAMUSCULAR; INTRAVENOUS
Status: COMPLETED
Start: 2024-04-17 | End: 2024-04-17

## 2024-04-17 RX ORDER — SODIUM CHLORIDE, SODIUM LACTATE, POTASSIUM CHLORIDE, CALCIUM CHLORIDE 600; 310; 30; 20 MG/100ML; MG/100ML; MG/100ML; MG/100ML
INJECTION, SOLUTION INTRAVENOUS
Status: DISCONTINUED | OUTPATIENT
Start: 2024-04-17 | End: 2024-04-17 | Stop reason: SURG

## 2024-04-17 RX ORDER — HYDROMORPHONE HYDROCHLORIDE 1 MG/ML
0.4 INJECTION, SOLUTION INTRAMUSCULAR; INTRAVENOUS; SUBCUTANEOUS
Status: DISCONTINUED | OUTPATIENT
Start: 2024-04-17 | End: 2024-04-17 | Stop reason: HOSPADM

## 2024-04-17 RX ADMIN — ACETAMINOPHEN 1000 MG: 500 TABLET, FILM COATED ORAL at 12:17

## 2024-04-17 RX ADMIN — DEXAMETHASONE SODIUM PHOSPHATE 4 MG: 4 INJECTION INTRA-ARTICULAR; INTRALESIONAL; INTRAMUSCULAR; INTRAVENOUS; SOFT TISSUE at 12:30

## 2024-04-17 RX ADMIN — CEFAZOLIN 2 G: 1 INJECTION, POWDER, FOR SOLUTION INTRAMUSCULAR; INTRAVENOUS at 12:53

## 2024-04-17 RX ADMIN — FENTANYL CITRATE 25 MCG: 50 INJECTION, SOLUTION INTRAMUSCULAR; INTRAVENOUS at 14:26

## 2024-04-17 RX ADMIN — MAGNESIUM SULFATE HEPTAHYDRATE 4 G: 2 INJECTION, SOLUTION INTRAVENOUS at 12:53

## 2024-04-17 RX ADMIN — TRANEXAMIC ACID 1000 MG: 100 INJECTION, SOLUTION INTRAVENOUS at 14:20

## 2024-04-17 RX ADMIN — TRANEXAMIC ACID 1000 MG: 100 INJECTION, SOLUTION INTRAVENOUS at 12:53

## 2024-04-17 RX ADMIN — CLINDAMYCIN PHOSPHATE 900 MG: 150 INJECTION, SOLUTION INTRAMUSCULAR; INTRAVENOUS at 12:53

## 2024-04-17 RX ADMIN — BUPIVACAINE HYDROCHLORIDE 15 ML: 5 INJECTION, SOLUTION EPIDURAL; INTRACAUDAL at 12:30

## 2024-04-17 RX ADMIN — PROPOFOL 120 MG: 10 INJECTION, EMULSION INTRAVENOUS at 12:51

## 2024-04-17 RX ADMIN — MIDAZOLAM HYDROCHLORIDE 2 MG: 1 INJECTION, SOLUTION INTRAMUSCULAR; INTRAVENOUS at 12:36

## 2024-04-17 RX ADMIN — ONDANSETRON 8 MG: 2 INJECTION INTRAMUSCULAR; INTRAVENOUS at 14:26

## 2024-04-17 RX ADMIN — DEXAMETHASONE SODIUM PHOSPHATE 8 MG: 4 INJECTION, SOLUTION INTRA-ARTICULAR; INTRALESIONAL; INTRAMUSCULAR; INTRAVENOUS; SOFT TISSUE at 12:53

## 2024-04-17 RX ADMIN — GABAPENTIN 300 MG: 300 CAPSULE ORAL at 12:17

## 2024-04-17 RX ADMIN — SODIUM CHLORIDE, POTASSIUM CHLORIDE, SODIUM LACTATE AND CALCIUM CHLORIDE: 600; 310; 30; 20 INJECTION, SOLUTION INTRAVENOUS at 12:27

## 2024-04-17 RX ADMIN — PROPOFOL 50 MG: 10 INJECTION, EMULSION INTRAVENOUS at 14:32

## 2024-04-17 RX ADMIN — FENTANYL CITRATE 25 MCG: 50 INJECTION, SOLUTION INTRAMUSCULAR; INTRAVENOUS at 14:32

## 2024-04-17 NOTE — ANESTHESIA PROCEDURE NOTES
Peripheral Block    Date/Time: 4/17/2024 12:34 PM    Performed by: Angi Little M.D.  Authorized by: Angi Little M.D.    Start Time:  4/17/2024 12:34 PM  End Time:  4/17/2024 12:37 PM  Reason for Block: at surgeon's request and post-op pain management ONLY    patient identified, IV checked, site marked, risks and benefits discussed, surgical consent, monitors and equipment checked, pre-op evaluation and timeout performed    Patient Position:  Supine  Prep: ChloraPrep    Monitoring:  Heart rate, continuous pulse ox and cardiac monitor  Block Region:  Upper Extremity  Upper Extremity - Block Type:  BRACHIAL PLEXUS block, Supraclavicular approach    Laterality:  Right  Procedures: ultrasound guided  Image captured, interpreted and electronically stored.  Block Type:  Single-shot  Needle Length:  100mm  Needle Gauge:  21 G  Needle Localization:  Ultrasound guidance  Ultrasound picture in chart  Injection Assessment:  Negative aspiration for heme, no paresthesia on injection, incremental injection and local visualized surrounding nerve on ultrasound  Evidence of intravascular injection: No     US Guided Supraclavicular Brachial Plexus Block    US transducer placed cephalad and parallel to clavicle in angle to view the subclavian artery with the brachial plexus lateral and superficial to the artery. Needle inserted lateral to the transducer in-plane and advanced with the needle tip visualized continually into the perineural position. After negative aspiration, LA injected without resistance.

## 2024-04-17 NOTE — LETTER
April 5, 2024    Patient Name: Eugene Klein  Surgeon Name: To Sierra M.D.  Surgery Facility: [facility] ([address] [place] [address])  Surgery Date: 4/17/2024    The time of your surgery is not final and may change up to and until the day of your surgery. You will be contacted 24-48 hours prior to your surgery date with your check-in and surgery time.    If you will not be at one of the below numbers please call the surgery scheduler at [phone]  Preferred Phone: [phone]    BEFORE YOUR SURGERY   Pre Registration and/or Lab Work must be done within and no earlier than 28 days prior to your surgery date. Your scheduled facility will contact you regarding all required preregistration and/or lab work. If you have not been contacted within 7 days of your scheduled procedure please call [facility] at [phone] for an appointment as soon as possible.    Instructions: Bring a list of all medications you are taking including the dosing and frequency.    DAY OF YOUR SURGERY  Nothing to eat or drink after midnight     Refrain from smoking any substance after midnight prior to surgery. Smoking may interfere with the anesthetic and frequently produces nausea during the recovery period.    Continue taking all lifesaving medications. Including the morning of your surgery with small sip of water.        Please do NOT take on the day of surgery:  Diuretics: examples- furosemide (Lasix), spironolactone, hydrochlorothiazide  ACE-inhibitors: examples- lisinopril, ramipril, enalapril  “ARBs”: examples- losartan, Olmesartan, valsartan    Please arrive at the hospital/surgery center at the check-in time provided.     An adult will need to bring you and take you home after your surgery.     AFTER YOUR SURGERY  Post op Appointment:   Date: 4/30   Time: 11:15AM   With: To Sierra MD   Location: [address] [place] [id number]    - Post Surgery - You will need someone to  drive you home     TIME OFF WORK  FMLA or Disability forms can be faxed directly to: [phone] or you may drop them off at [address] [place] [address]. Our office charges a $35.00 fee per form. Forms will be completed within 10 business days of drop off and payment received. For the status of your forms you may contact our disability office directly [phone].    MEDICATION INSTRUCTIONS **Please read section completely**  The following medications should be stopped a minimum of 10 days prior to surgery:  All over the counter, Supplements & Herbal medications    Anorectics: Phentermine (Adipex-P, Lomaira and Suprenza), Phentermine-topiramate (Qsymia), Bupropion-naltrexone (Contrave)    **If you are on Bupropion for anxiety/depression, please continue this medication up until the day of surgery.     Opiod Partial Agonists/Opioid Antagonists: Buprenorphine (Suboxone, Belbuca, Butrans, Probuphine Implant, Sublocade), Naltrexone (ReVia, Vivitrol), Naloxone    Amphetamines: Dextroamphetamine/Amphetamine (Adderall, Mydayis), Methylphenidate Hydrochloride (Concerta, Metadate, Methylin, Ritalin)    The following medications should be stopped 5 days prior to surgery:  Blood Thinners: Any Aspirin, Aspirin products, anti-inflammatories such as ibuprofen and any blood thinners such as Coumadin and Plavix. Please consult your prescribing physician if you are on life saving blood thinners, in regards to when to stop medications prior to surgery.     The following medications should be stopped a minimum of 3 days prior to surgery:  PDE-5 inhibitors: Sildenafil (Viagra), Tadalafil (Cialis), Vardenafil (Levitra), Avanafil (Stendra)    MAO Inhibitors: Rasagiline (Azilect), Selegiline (Eldepryl, Emsam, Selapar), Isocarboxazid (Marplan), Phenelzine (Nardil)       COVID and INFLUENZA NOTICE TO PATIENTS    Currently, the [facility] does not routinely test patients for COVID-19 or Influenza prior to  their elective surgery.  However, if patients develop the following symptoms prior to their surgery date, they should voluntarily test for COVID-19 and Influenza and notify the surgical office of their condition and results.  The symptoms warranting testing would be any two of the following:    Fever (Temp above 100.4 F)  Chills  Cough  Shortness of breath or difficulty breathing  Fatigue  Myalgias (muscle or body aches)  Headache  Sore Throat  Congestion or Runny Nose  Nausea or vomiting  Diarrhea  New loss of taste or smell    Having these symptoms prior to surgery can significantly increase your risk of morbidity and mortality under anesthesia, which may compromise your health and require a transfer to a hospital for a higher level of care.  Therefore, it is advisable to notify the surgical team of any illness in order to get information for the appropriate time to delay the surgery to minimize these preventable risks.    Your health and safety are our number one priority at the [facility], and we are thankful that you entrust us with your care.  Please help us ensure the best possible surgical and anesthetic outcome by sharing appropriate health information with our perioperative team and staff.  We look forward to taking great care of you!    Thank you for your time and consideration on this matter.    Tang Brown MD  Medical Director  Anesthesiologist  [facility]

## 2024-04-17 NOTE — ANESTHESIA PROCEDURE NOTES
Airway    Date/Time: 4/17/2024 12:52 PM    Performed by: Angi Little M.D.  Authorized by: Angi Little M.D.    Location:  OR  Urgency:  Elective  Indications for Airway Management:  Anesthesia      Spontaneous Ventilation: absent    Sedation Level:  Deep  Preoxygenated: Yes    Mask Difficulty Assessment:  0 - not attempted  Final Airway Type:  Supraglottic airway  Final Supraglottic Airway:  Standard LMA    SGA Size:  4  Airway Seal Pressure (cm H2O):  21  Number of Attempts at Approach:  1

## 2024-04-17 NOTE — DISCHARGE INSTRUCTIONS
Keep splint on, clean, and dry until clinic follow-up. Elevate above heart and ice frequently for at least 2 weeks.     No heavy lifting or grasping for at least 6 weeks.     No driving while on narcotic pain medications. Contact auto-insurance carrier for clearance to begin driving again.     Call MD or come to ER for any major concerns     If any questions arise, call your provider.  If your provider is not available, please feel free to call the Surgical Center at [phone].    MEDICATIONS: Resume taking daily medication.  Take prescribed pain medication with food.  If no medication is prescribed, you may take non-aspirin pain medication if needed.  PAIN MEDICATION CAN BE VERY CONSTIPATING.  Take a stool softener or laxative such as senokot, pericolace, or milk of magnesia if needed.    You were given tylenol at 1217. You may take another dose after 6:17pm.    What to Expect Post Anesthesia    Rest and take it easy for the first 24 hours.  A responsible adult is recommended to remain with you during that time.  It is normal to feel sleepy.  We encourage you to not do anything that requires balance, judgment or coordination.    FOR 24 HOURS DO NOT:  Drive, operate machinery or run household appliances.  Drink beer or alcoholic beverages.  Make important decisions or sign legal documents.    To avoid nausea, slowly advance diet as tolerated, avoiding spicy or greasy foods for the first day.  Add more substantial food to your diet according to your provider's instructions.  Babies can be fed formula or breast milk as soon as they are hungry.  INCREASE FLUIDS AND FIBER TO AVOID CONSTIPATION.    MILD FLU-LIKE SYMPTOMS ARE NORMAL.  YOU MAY EXPERIENCE GENERALIZED MUSCLE ACHES, THROAT IRRITATION, HEADACHE AND/OR SOME NAUSEA.

## 2024-04-17 NOTE — ANESTHESIA TIME REPORT
Anesthesia Start and Stop Event Times       Date Time Event    4/17/2024 1235 Ready for Procedure     1245 Anesthesia Start     1454 Anesthesia Stop          Responsible Staff  04/17/24      Name Role Begin End    Angi Little M.D. Anesth [id number]          Overtime Reason:  no overtime (within assigned shift)    Comments:

## 2024-04-17 NOTE — LETTER
April 2, 2024    Patient Name: Eugene Klein  Surgeon Name: To Sierra M.D.  Surgery Facility: [facility] ([address])  Surgery Date: 4/10/2024    The time of your surgery is not final and may change up to and until the day of your surgery. You will be contacted 24-48 hours prior to your surgery date with your check-in and surgery time.    If you will not be at one of the below numbers please call the surgery scheduler at [phone]  Preferred Phone: [phone]    BEFORE YOUR SURGERY   Pre Registration and/or Lab Work must be done within and no earlier than 28 days prior to your surgery date. Your scheduled facility will contact you regarding all required preregistration and/or lab work. If you have not been contacted within 7 days of your scheduled procedure please call [facility] at [phone] for an appointment as soon as possible.    Instructions: Bring a list of all medications you are taking including the dosing and frequency.    DAY OF YOUR SURGERY  Nothing to eat or drink after midnight     Refrain from smoking any substance after midnight prior to surgery. Smoking may interfere with the anesthetic and frequently produces nausea during the recovery period.    Continue taking all lifesaving medications. Including the morning of your surgery with small sip of water.        Please do NOT take on the day of surgery:  Diuretics: examples- furosemide (Lasix), spironolactone, hydrochlorothiazide  ACE-inhibitors: examples- lisinopril, ramipril, enalapril  “ARBs”: examples- losartan, Olmesartan, valsartan    Please arrive at the hospital/surgery center at the check-in time provided.     An adult will need to bring you and take you home after your surgery.     AFTER YOUR SURGERY  Post op Appointment:   Date: 4/25   Time: 1030AM   With: To Sierra MD   Location: [address] [place] [id number]    - Post Surgery -  You will need someone to drive you home  - You must have someone provide transportation post surgery and someone to monitor you for at least 24 hours post-surgery. If you don't have either of these your appointment will be canceled.     TIME OFF WORK  FMLA or Disability forms can be faxed directly to: [phone] or you may drop them off at [address] [place] [address]. Our office charges a $35.00 fee per form. Forms will be completed within 10 business days of drop off and payment received. For the status of your forms you may contact our disability office directly [phone].    MEDICATION INSTRUCTIONS **Please read section completely**  The following medications should be stopped a minimum of 10 days prior to surgery:  All over the counter, Supplements & Herbal medications    Anorectics: Phentermine (Adipex-P, Lomaira and Suprenza), Phentermine-topiramate (Qsymia), Bupropion-naltrexone (Contrave)    **If you are on Bupropion for anxiety/depression, please continue this medication up until the day of surgery.     Opiod Partial Agonists/Opioid Antagonists: Buprenorphine (Suboxone, Belbuca, Butrans, Probuphine Implant, Sublocade), Naltrexone (ReVia, Vivitrol), Naloxone    Amphetamines: Dextroamphetamine/Amphetamine (Adderall, Mydayis), Methylphenidate Hydrochloride (Concerta, Metadate, Methylin, Ritalin)    The following medications should be stopped 5 days prior to surgery:  Blood Thinners: Any Aspirin, Aspirin products, anti-inflammatories such as ibuprofen and any blood thinners such as Coumadin and Plavix. Please consult your prescribing physician if you are on life saving blood thinners, in regards to when to stop medications prior to surgery.     The following medications should be stopped a minimum of 3 days prior to surgery:  PDE-5 inhibitors: Sildenafil (Viagra), Tadalafil (Cialis), Vardenafil (Levitra), Avanafil (Stendra)    MAO Inhibitors: Rasagiline (Azilect), Selegiline (Eldepryl, Emsam,  Selapar), Isocarboxazid (Marplan), Phenelzine (Nardil)       COVID and INFLUENZA NOTICE TO PATIENTS    Currently, the [facility] does not routinely test patients for COVID-19 or Influenza prior to their elective surgery.  However, if patients develop the following symptoms prior to their surgery date, they should voluntarily test for COVID-19 and Influenza and notify the surgical office of their condition and results.  The symptoms warranting testing would be any two of the following:    Fever (Temp above 100.4 F)  Chills  Cough  Shortness of breath or difficulty breathing  Fatigue  Myalgias (muscle or body aches)  Headache  Sore Throat  Congestion or Runny Nose  Nausea or vomiting  Diarrhea  New loss of taste or smell    Having these symptoms prior to surgery can significantly increase your risk of morbidity and mortality under anesthesia, which may compromise your health and require a transfer to a hospital for a higher level of care.  Therefore, it is advisable to notify the surgical team of any illness in order to get information for the appropriate time to delay the surgery to minimize these preventable risks.    Your health and safety are our number one priority at the [facility], and we are thankful that you entrust us with your care.  Please help us ensure the best possible surgical and anesthetic outcome by sharing appropriate health information with our perioperative team and staff.  We look forward to taking great care of you!    Thank you for your time and consideration on this matter.    Tang Brown MD  Medical Director  Anesthesiologist  [facility]

## 2024-04-17 NOTE — OR NURSING
1449 - Pt to PACU from OR. Report from anesthesia and OR RN. On 8L O2 via mask. Respirations even and unlabored. VSS. Dressing to rt shoulder C/D/I. Rt shoulder in sling.    1526 - Pt wife updated by telephone.    1530 - Pt wife at bedside.     1535 - Xray tech notified that xray needs to be done.    1543 - xray at bedside.    1600 - Pt dressed. Discharge instructions reviewed with and signed by pt spouse. All questions answered and verbalized understanding.     1613 - Pt wheeled to [facility] via wheelchair by CNA. PIV removed. NAD. VSS. Belongings with patient.

## 2024-04-17 NOTE — ANESTHESIA POSTPROCEDURE EVALUATION
Patient: Eugene Klein    Procedure Summary       Date: 04/17/24 Room / Location: [facility] ROOM 21 / SURGERY SAME DAY [facility]    Anesthesia Start: 1245 Anesthesia Stop: 1454    Procedure: RIGHT TOTAL ELBOW ARTHROPLASTY (Right: Elbow) Diagnosis:       Arthritis of right elbow      (Arthritis of right elbow)    Surgeons: To Sierra M.D. Responsible Provider: Angi Little M.D.    Anesthesia Type: general, peripheral nerve block ASA Status: 2            Final Anesthesia Type: general, peripheral nerve block  Last vitals  BP   Blood Pressure : (!) 160/72    Temp   36.2 °C (97.1 °F)    Pulse   62   Resp   (!) 25    SpO2   99 %      Anesthesia Post Evaluation    Patient location during evaluation: PACU  Patient participation: complete - patient participated  Level of consciousness: awake and alert    Airway patency: patent  Anesthetic complications: no  Cardiovascular status: hemodynamically stable  Respiratory status: acceptable  Hydration status: euvolemic    PONV: none          No notable events documented.     Nurse Pain Score: 0 (NPRS)

## 2024-04-17 NOTE — ANESTHESIA PREPROCEDURE EVALUATION
Case: [id number] Date/Time: 04/17/24 1245    Procedure: RIGHT TOTAL ELBOW ARTHROPLASTY    Diagnosis: Arthritis of right elbow [M19.021]    Pre-op diagnosis: Arthritis of right elbow [M19.021]    Location: [facility] ROOM 21 / SURGERY SAME DAY [facility]    Surgeons: To Sierra M.D.          68yo M here for total elbow replacement    Relevant Problems   ANESTHESIA   (positive) PONV (postoperative nausea and vomiting)      CARDIAC   (positive) Hypertension      Other   (positive) Alcohol use (3-4 glasses wine daily)   (positive) Arthritis of right elbow   (positive) Neuropathy       Physical Exam    Airway   Mallampati: II  TM distance: >3 FB  Neck ROM: full       Cardiovascular - normal exam  Rhythm: regular  Rate: normal  (-) murmur     Dental       Very poor dentition     Pulmonary - normal exam  Breath sounds clear to auscultation     Abdominal    Neurological - normal exam                   Anesthesia Plan    ASA 2       Plan - general and peripheral nerve block     Peripheral nerve block will be post-op pain control  Airway plan will be LMA          Induction: intravenous    Postoperative Plan: Postoperative administration of opioids is intended.    Pertinent diagnostic labs and testing reviewed    Informed Consent:    Anesthetic plan and risks discussed with patient.    Use of blood products discussed with: patient whom consented to blood products.

## 2024-04-17 NOTE — INTERVAL H&P NOTE
H&P reviewed. The patient was examined and there are no changes to the H&P    Past medical history, past surgical history, family history, social history noncontributory    Review of systems negative on 12 point scale

## 2024-04-18 NOTE — OP REPORT
Surgeon: To Sierra MD    Assistants: None    Preoperative Diagnosis: Right elbow osteoarthritis    Postoperative Diagnosis: Same    Procedure: Right total elbow arthroplasty with radial head excision    Anesthesia: General with upper extremity nerve block for pain control    Anesthesiologist: Angi Little MD    Complications: none noted    Drains: None    Specimens: None    Estimated blood loss: 50 mL with tourniquet time less than 90 minutes at 250 mmHg.    Indications: Mr. Klein is a 79-year-old gentleman well-known to me through my outpatient clinic for the above-mentioned diagnosis.  He believes and I would agree he is failed conservative treatment and is a candidate for the above-mentioned procedure. Prior to the procedure, Mr. Klein understood the risks to include but not be limited to the risk of infection requiring repeat surgery bleeding requiring blood transfusion, nerve blood vessel tendon injury requiring repair, periprosthetic fracture subsidence or loosening or surgical failure requiring revision surgery or salvage procedure, dissatisfaction with the outcome, unemployment or being fired over the injury or surgery, stiffness, complex regional pain syndrome, acquiring coronavirus its complications, DVT, pulmonary embolism, heart attack, stroke, and even death.  The patient stated despite these risks they wish to proceed with surgery.    Procedure: Mr. Klein was met in the preoperative holding area and his right elbow was initialed as the correct operative site.  He had an opportunity to ask questions and all questions were answered and informed consent was obtained.    The patient was brought to the operating room and laid supine on the operating room table.  All bony independent prominences well-padded.  Upper extremity nerve block and general esthesia were induced without known complication.  Ancef was administered for infection prophylaxis.  The patient was placed into a well-padded  lateral decubitus position and the right upper extremities prepped and draped in usual sterile fashion.  A formal timeout was performed in which all parties agreed upon the correct patient procedure operative site.    Began the procedure by using Esmarch to exsanguinate the extremity inflated the tourniquet to 250 mmHg.  I then made a longitudinal incision of the posterior aspect of the elbow for extensile approach to the elbow.  Elevated full-thickness skin flaps.  Identified the ulnar nerve medially and gently performed ulnar neurolysis and gently retracted upon it for the duration of the procedure I then performed a para-olecranon, triceps sparing approach to the elbow and created a medial window, using Bovie cautery and a Treviño elevator to release all capsular and ligamentous attachments off of the distal humerus and proximal ulna and radius while avoiding the location of the median radial nerve and preserve the triceps insertion.  I then dislocated the elbow using oscillating saw to perform a radial head excision to prevent bony impingement on the metal implant.  I then used an oscillating saw to perform a perpendicular cut to the shaft of the humerus at the level of the metaphysis and the bell saw for size medium on the ulnar side, all done with the Tornier system.  I then reamed and broached the humerus and ulna up to a size medium placement restrictors and cemented in the components.  This restored full flexion extension of the elbow without further bony or soft tissue impingement.  I then copiously gated the joint with normal saline and deflated the tourniquet and used Bovie cautery to treatment stasis.  I repaired the medial and lateral windows with 0 Vicryl suture with the ulnar nerve in a subcutaneous position without pressure or tension on the nerve.  I then closed the skin with 2 Monocryl suture and skin stapler, all covered with there is sterile Xeroform 4 x 4's well-padded splint.  The patient then  awoke from anesthesia without known complication was transferred in stable condition to the PACU where there were no immediate postoperative complaints.      Postoperative Plan: The patient will be discharged home per same-day criteria center use of the hand and follow-up in clinic in 10 to 14 days for staple removal and wound check.

## 2024-10-21 NOTE — PROGRESS NOTES
- Lipid panel reviewed, total cholesterol 193, triglycerides 117, HDL 52, .  - Can continue on home dose of Zetia as do not suspect cerebrovascular etiology for symptoms.    CC: Annual follow-up visit    Verified identification with patient. Secured video conference with RN presenter in [facility]      HPI:   Eugene presents today with the following.    1. Essential hypertension  Patient is stable on lisinopril 20 mg daily. Patient denies headache, dizziness, chest pain, shortness of breath, palpitations or edema    2. Environmental allergies  Requesting prescription for Flonase through [facility]. Stable    3. Polyp of colon, unspecified part of colon, unspecified type  Patient will be having his colonoscopy in [facility] on March 22    4. Neuropathy (CMS-HCC)  Diagnosed many years ago. Patient discontinued vitamin B-12 as this showed no benefit in his symptoms. Symptoms are mild, not debilitating. Location of neuropathy, bilateral lower extremity    4. Cough   Patient concerned about a cough that had been present for a few months. Possibly related to postnasal drip which he tries to treat with Flonase. Patient denies shortness of breath or chest pain. Patient is concerned as he was a former smoker, quit tobacco use in 2004. Patient denies hemoptysis, productive cough, wheezing.      Patient Active Problem List    Diagnosis Date Noted   • Cough 03/08/2018   • Hypertension 06/20/2016   • Environmental allergies 06/20/2016   • Colon polyp 06/20/2016   • Neuropathy (CMS-HCC) 06/20/2016   • Vitamin B 12 deficiency 06/20/2016       Current Outpatient Prescriptions   Medication Sig Dispense Refill   • fluticasone (FLONASE) 50 MCG/ACT nasal spray Spray 1 Spray in nose every day. 3 Bottle 2   • GAVILYTE-G 236 g Recon Soln      • lisinopril (PRINIVIL) 20 MG Tab Take 1 Tab by mouth every day. 90 Tab 3   • lisinopril (PRINIVIL) 20 MG Tab Take 20 mg by mouth every day.     • fluticasone (FLONASE) 50 MCG/ACT nasal spray Spray 1 Spray in nose every day.       No current facility-administered medications for this visit.          Allergies as of 03/08/2018   • (No Known Allergies)         ROS: As per HPI. Denies all other cardiopulmonary, GI, neurologic symptoms    /78   Pulse 67   Temp 36.7 °C (98.1 °F)   Resp 16   Ht 1.829 m (6')   Wt 73.9 kg (163 lb)   SpO2 95%   BMI 22.11 kg/m²     Physical Exam:  Gen:         Alert and oriented, No apparent distress.  Neck:        No cervical/submandibular lymphadenopathy. No thyromegaly. Neck is supple  Lungs:     Clear to auscultation bilaterally, no wheezes rhonchi or crackles  CV:          Regular rate and rhythm. No murmurs, rubs or gallops.  Abd:         Soft non tender, non distended. Normal active bowel sounds.  No  Hepatosplenomegaly, No pulsatile masses.                   Ext:          No clubbing, cyanosis, edema.  Neuro:     Grossly intact    Assessment and Plan.   63 y.o. male with the following issues.    1. Essential hypertension  Continue lisinopril 20 mg daily  Stable    - COMP METABOLIC PANEL; Future    2. Environmental allergies    - fluticasone (FLONASE) 50 MCG/ACT nasal spray; Spray 1 Spray in nose every day.  Dispense: 3 Bottle; Refill: 2    3. Polyp of colon, unspecified part of colon, unspecified type  Keep follow-up appointment with GI for colonoscopy  Release of information from     4. Neuropathy (CMS-HCC)  Discussed options such as Lyrica and gabapentin  Patient stable for now    - CBC WITH DIFFERENTIAL; Future  - TSH; Future    5. Vitamin D deficiency    - VITAMIN D,25 HYDROXY; Future    6. Screening cholesterol level    - LIPID PROFILE; Future    7. Screening for prostate cancer    - PROSTATE SPECIFIC AG SCREENING; Future    8. Cough    - DX-CHEST-2 VIEWS; Future            Please note that this dictation was created using voice recognition software. I have made every reasonable attempt to correct obvious errors, but expect that there are errors of grammar and possible content that I did not discover before finalizing note.

## (undated) DEVICE — SLEEVE VASO CALF MED - (10PR/CA)

## (undated) DEVICE — SLING ORTH UNV TIETX VLFM ARM

## (undated) DEVICE — TUBE CONNECTING SUCTION - CLEAR PLASTIC STERILE 72 IN (50EA/CA)

## (undated) DEVICE — LACTATED RINGERS INJ 1000 ML - (14EA/CA 60CA/PF)

## (undated) DEVICE — WATER IRRIGATION STERILE 1000ML (12EA/CA)

## (undated) DEVICE — DRESSING PETROLEUM GAUZE 5 X 9" (50EA/BX 4BX/CA)"

## (undated) DEVICE — SUTURE GENERAL

## (undated) DEVICE — PAD PREP 24 X 48 CUFFED - (100/CA)

## (undated) DEVICE — SPONGE GAUZESTER 4 X 4 4PLY - (128PK/CA)

## (undated) DEVICE — DRAPE SURGICAL U 77X120 - (10/CA)

## (undated) DEVICE — SYRINGE 50ML CATHETER TIP (40EA/BX 4BX/CA)

## (undated) DEVICE — CANISTER SUCTION 3000ML MECHANICAL FILTER AUTO SHUTOFF MEDI-VAC NONSTERILE LF DISP  (40EA/CA)

## (undated) DEVICE — SET LEADWIRE 5 LEAD BEDSIDE DISPOSABLE ECG (1SET OF 5/EA)

## (undated) DEVICE — CANISTER SUCTION RIGID RED 1500CC (40EA/CA)

## (undated) DEVICE — GLOVE BIOGEL INDICATOR SZ 8 SURGICAL PF LTX - (50/BX 4BX/CA)

## (undated) DEVICE — SUCTION INSTRUMENT YANKAUER BULBOUS TIP W/O VENT (50EA/CA)

## (undated) DEVICE — GOWN WARMING STANDARD FLEX - (30/CA)

## (undated) DEVICE — CANNULA O2 COMFORT SOFT EAR ADULT 7 FT TUBING (50/CA)

## (undated) DEVICE — ELECTRODE DUAL RETURN W/ CORD - (50/PK)

## (undated) DEVICE — SYS BN CMNT HI VAC KT MXR BWL - (MIX-E-VAC II)  (10EA/CA)

## (undated) DEVICE — SUTURE 0 VICRYL PLUS CT-1 - 8 X 18 INCH (12/BX)

## (undated) DEVICE — TOWEL STOP TIMEOUT SAFETY FLAG (40EA/CA)

## (undated) DEVICE — GLOVE BIOGEL SZ 7.5 SURGICAL PF LTX - (50PR/BX 4BX/CA)

## (undated) DEVICE — SODIUM CHL IRRIGATION 0.9% 1000ML (12EA/CA)

## (undated) DEVICE — DRAPE STRLE REG TOWEL 18X24 - (10/BX 4BX/CA)"

## (undated) DEVICE — KIT  I.V. START (100EA/CA)

## (undated) DEVICE — DRESSING ABDOMINAL PAD STERILE 8 X 10" (360EA/CA)"

## (undated) DEVICE — BUR ROUND 4.0 X 55MM

## (undated) DEVICE — STOCKINET BIAS 4 IN STERILE - (20/CA)

## (undated) DEVICE — DRESSING XEROFORM 1X8 - (50/BX 4BX/CA)

## (undated) DEVICE — BLADE SAW SMALL BONE AGGRESSIVE 31.0 X 9 X 0.38MM

## (undated) DEVICE — PLUMEPEN ULTRA 3/8 IN X 10 FT HOSE (20EA/CA)

## (undated) DEVICE — SUTURE 2-0 MONOCRYL PLUS UNDYED CT-1 1 X 36 (36EA/BX)"

## (undated) DEVICE — DRAPE U SPLIT IMP 54 X 76 - (24/CA)

## (undated) DEVICE — PADDING CAST 6 IN STERILE - 6 X 4 YDS (24/CA)

## (undated) DEVICE — PADDING CAST 4 IN STERILE - 4 X 4 YDS (24/CA)

## (undated) DEVICE — BANDAGEESMARK BLUE 6X9' LF - 20/CS"

## (undated) DEVICE — TOWELS CLOTH SURGICAL - (4/PK 20PK/CA)

## (undated) DEVICE — PACK TOTAL HIP - (1/CA)

## (undated) DEVICE — SENSOR OXIMETER ADULT SPO2 RD SET (20EA/BX)

## (undated) DEVICE — MASK OXYGEN VNYL ADLT MED CONC WITH 7 FOOT TUBING  - (50EA/CA)

## (undated) DEVICE — STAPLER SKIN DISP - (6/BX 10BX/CA) VISISTAT

## (undated) DEVICE — TUBING CLEARLINK DUO-VENT - C-FLO (48EA/CA)